# Patient Record
Sex: MALE | Race: WHITE | NOT HISPANIC OR LATINO | URBAN - METROPOLITAN AREA
[De-identification: names, ages, dates, MRNs, and addresses within clinical notes are randomized per-mention and may not be internally consistent; named-entity substitution may affect disease eponyms.]

---

## 2023-01-24 NOTE — PROGRESS NOTES
"Chief Complaint  Establish Care, Heartburn, and Nausea    Subjective          Hima presents to [facility] for a new patient visit.  He has not seen a physician in quite some time.  He has a very interesting background.  He and his daughter are the subject of an upcoming movie in [employer] called [place] [place].  Both of his daughters had congenital biliary atresia, 1 has since passed away.  The patient has had a lot of loss in his life, and its only him and his daughter remaining.  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  He still works in the lawn care business, takes 10-20,000k steps per day depending on the season.    He is very concerned because he has now started burping and belching very frequently, even when drinking water.  He sometimes has discomfort in his upper abdomen as well.  No dysphagia, odynophagia, vomiting, melena, hematochezia.  He does get constipated at times.  He has never had a colonoscopy.  Tums helped his symptoms.    Blood pressure is up today, never has been diagnosed with hypertension.  He denies chest pain, shortness of breath, lower extremity edema, frequent headaches.    Only major medical issue he has had was major abdominal surgery in 2011 for a blockage.  He quit smoking at that point, smoked 1-1/2 packs/day from [id number].  He is an [employer] , started smoking when he was in the .     Objective   Vital Signs:   Vitals:    01/26/23 1002   BP: 169/96   Pulse: 64   Resp: 16   Temp: 97.3 °F (36.3 °C)   TempSrc: Skin   SpO2: 98%   Weight: 78 kg (172 lb)   Height: 180.3 cm (71\")                Physical Exam  Constitutional:       General: He is not in acute distress.     Appearance: Normal appearance. He is not toxic-appearing.   HENT:      Head: Normocephalic and atraumatic.      Mouth/Throat:      Mouth: Mucous membranes are moist.   Eyes:      General: No " scleral icterus.     Conjunctiva/sclera: Conjunctivae normal.   Cardiovascular:      Rate and Rhythm: Normal rate and regular rhythm.      Heart sounds: Normal heart sounds. No murmur heard.    No friction rub. No gallop.   Pulmonary:      Effort: Pulmonary effort is normal. No respiratory distress.      Breath sounds: Normal breath sounds.   Musculoskeletal:         General: No swelling, tenderness or deformity. Normal range of motion.      Cervical back: Normal range of motion and neck supple.   Skin:     General: Skin is warm and dry.      Findings: No lesion or rash.   Neurological:      General: No focal deficit present.      Mental Status: He is alert and oriented to person, place, and time.   Psychiatric:         Mood and Affect: Mood normal.         Behavior: Behavior normal.         Judgment: Judgment normal.          Result Review :                Assessment and Plan    Diagnoses and all orders for this visit:    1. Heartburn (Primary)  Assessment & Plan:  The main symptom the patient has been having is belching.  No weight loss, melena, hematochezia, odynophagia, dysphagia.  He does get some mild dyspepsia at times.  We will check CBC and start PPI empirically.  I did consider testing him for H. Pylori, but will hold off for now.  Will monitor response to PPI to elucidate if further work-up is needed.      2. Need for hepatitis C screening test  -     Hepatitis C antibody    3. Screening for lipid disorders  -     Comprehensive metabolic panel  -     Lipid panel    4. Primary hypertension  Assessment & Plan:  Asymptomatic, has been going on for years.  I checked his blood pressure myself and it was 170/100 in his right upper extremity.  I started him on amlodipine and we will check a renal function panel.  We will see him back in about a month to monitor response, suspect he may need a second medication.    Orders:  -     CBC w AUTO Differential  -     Comprehensive metabolic panel    5. Colon cancer  screening  -     Ambulatory Referral to Gastroenterology    6. Routine health maintenance  Assessment & Plan:  Lipid panel ordered  Hepatitis C screening ordered  Discussed risks and benefit of PSA testing, patient prefers to hold off for now.  Refer to gastroenterology for colon cancer screening  Defer discussion of vaccinations today, discuss next visit.  Will be a candidate for lung cancer screening and AAA screening, discuss at future visits.      7. Dyspepsia    Other orders  -     pantoprazole (PROTONIX) 40 MG EC tablet; Take 1 tablet by mouth Every Night.  Dispense: 90 tablet; Refill: 2  -     amLODIPine (NORVASC) 10 MG tablet; Take 1 tablet by mouth Daily.  Dispense: 30 tablet; Refill: 1      Follow Up   Return in about 4 weeks (around 2/23/2023) for Recheck, Next scheduled follow up.  Patient was given instructions and counseling regarding his condition or for health maintenance advice. Please see specific information pulled into the AVS if appropriate.

## 2023-01-26 ENCOUNTER — OFFICE VISIT (OUTPATIENT)
Dept: FAMILY MEDICINE CLINIC | Facility: CLINIC | Age: 68
End: 2023-01-26
Payer: MEDICARE

## 2023-01-26 VITALS
WEIGHT: 172 LBS | HEIGHT: 71 IN | SYSTOLIC BLOOD PRESSURE: 169 MMHG | HEART RATE: 64 BPM | DIASTOLIC BLOOD PRESSURE: 96 MMHG | BODY MASS INDEX: 24.08 KG/M2 | TEMPERATURE: 97.3 F | RESPIRATION RATE: 16 BRPM | OXYGEN SATURATION: 98 %

## 2023-01-26 DIAGNOSIS — R12 HEARTBURN: Primary | ICD-10-CM

## 2023-01-26 DIAGNOSIS — I10 PRIMARY HYPERTENSION: ICD-10-CM

## 2023-01-26 DIAGNOSIS — Z13.220 SCREENING FOR LIPID DISORDERS: ICD-10-CM

## 2023-01-26 DIAGNOSIS — Z11.59 NEED FOR HEPATITIS C SCREENING TEST: ICD-10-CM

## 2023-01-26 DIAGNOSIS — R10.13 DYSPEPSIA: ICD-10-CM

## 2023-01-26 DIAGNOSIS — Z00.00 ROUTINE HEALTH MAINTENANCE: ICD-10-CM

## 2023-01-26 DIAGNOSIS — Z12.11 COLON CANCER SCREENING: ICD-10-CM

## 2023-01-26 PROCEDURE — 99204 OFFICE O/P NEW MOD 45 MIN: CPT | Performed by: INTERNAL MEDICINE

## 2023-01-26 RX ORDER — PANTOPRAZOLE SODIUM 40 MG/1
40 TABLET, DELAYED RELEASE ORAL NIGHTLY
Qty: 90 TABLET | Refills: 2 | Status: SHIPPED | OUTPATIENT
Start: 2023-01-26

## 2023-01-26 RX ORDER — AMLODIPINE BESYLATE 10 MG/1
10 TABLET ORAL DAILY
Qty: 30 TABLET | Refills: 1 | Status: SHIPPED | OUTPATIENT
Start: 2023-01-26 | End: 2023-02-27 | Stop reason: SDUPTHER

## 2023-01-26 NOTE — ASSESSMENT & PLAN NOTE
The main symptom the patient has been having is belching.  No weight loss, melena, hematochezia, odynophagia, dysphagia.  He does get some mild dyspepsia at times.  We will check CBC and start PPI empirically.  I did consider testing him for H. Pylori, but will hold off for now.  Will monitor response to PPI to elucidate if further work-up is needed.

## 2023-01-26 NOTE — ASSESSMENT & PLAN NOTE
Asymptomatic, has been going on for years.  I checked his blood pressure myself and it was 170/100 in his right upper extremity.  I started him on amlodipine and we will check a renal function panel.  We will see him back in about a month to monitor response, suspect he may need a second medication.

## 2023-01-26 NOTE — PATIENT INSTRUCTIONS
It was so nice meeting you today. I look forward to working with you on a plan to get/keep you healthy and happy!

## 2023-01-26 NOTE — ASSESSMENT & PLAN NOTE
Lipid panel ordered  Hepatitis C screening ordered  Discussed risks and benefit of PSA testing, patient prefers to hold off for now.  Refer to gastroenterology for colon cancer screening  Defer discussion of vaccinations today, discuss next visit.  Will be a candidate for lung cancer screening and AAA screening, discuss at future visits.

## 2023-01-27 LAB
ALBUMIN SERPL-MCNC: 4.9 G/DL (ref 3.5–5.2)
ALBUMIN/GLOB SERPL: 2.3 G/DL
ALP SERPL-CCNC: 71 U/L (ref 39–117)
ALT SERPL-CCNC: 11 U/L (ref 1–41)
AST SERPL-CCNC: 14 U/L (ref 1–40)
BASOPHILS # BLD AUTO: 0.06 10*3/MM3 (ref 0–0.2)
BASOPHILS NFR BLD AUTO: 0.9 % (ref 0–1.5)
BILIRUB SERPL-MCNC: 0.3 MG/DL (ref 0–1.2)
BUN SERPL-MCNC: 15 MG/DL (ref 8–23)
BUN/CREAT SERPL: 14.6 (ref 7–25)
CALCIUM SERPL-MCNC: 10.1 MG/DL (ref 8.6–10.5)
CHLORIDE SERPL-SCNC: 100 MMOL/L (ref 98–107)
CHOLEST SERPL-MCNC: 170 MG/DL (ref 0–200)
CO2 SERPL-SCNC: 30.3 MMOL/L (ref 22–29)
CREAT SERPL-MCNC: 1.03 MG/DL (ref 0.76–1.27)
EGFRCR SERPLBLD CKD-EPI 2021: 79.6 ML/MIN/1.73
EOSINOPHIL # BLD AUTO: 0.08 10*3/MM3 (ref 0–0.4)
EOSINOPHIL NFR BLD AUTO: 1.2 % (ref 0.3–6.2)
ERYTHROCYTE [DISTWIDTH] IN BLOOD BY AUTOMATED COUNT: 14 % (ref 12.3–15.4)
GLOBULIN SER CALC-MCNC: 2.1 GM/DL
GLUCOSE SERPL-MCNC: 124 MG/DL (ref 65–99)
HCT VFR BLD AUTO: 43.7 % (ref 37.5–51)
HCV AB S/CO SERPL IA: <0.1 S/CO RATIO (ref 0–0.9)
HDLC SERPL-MCNC: 48 MG/DL (ref 40–60)
HGB BLD-MCNC: 14 G/DL (ref 13–17.7)
IMM GRANULOCYTES # BLD AUTO: 0.01 10*3/MM3 (ref 0–0.05)
IMM GRANULOCYTES NFR BLD AUTO: 0.2 % (ref 0–0.5)
LDLC SERPL CALC-MCNC: 102 MG/DL (ref 0–100)
LYMPHOCYTES # BLD AUTO: 1.72 10*3/MM3 (ref 0.7–3.1)
LYMPHOCYTES NFR BLD AUTO: 26.8 % (ref 19.6–45.3)
MCH RBC QN AUTO: 25.7 PG (ref 26.6–33)
MCHC RBC AUTO-ENTMCNC: 32 G/DL (ref 31.5–35.7)
MCV RBC AUTO: 80.2 FL (ref 79–97)
MONOCYTES # BLD AUTO: 0.49 10*3/MM3 (ref 0.1–0.9)
MONOCYTES NFR BLD AUTO: 7.6 % (ref 5–12)
NEUTROPHILS # BLD AUTO: 4.05 10*3/MM3 (ref 1.7–7)
NEUTROPHILS NFR BLD AUTO: 63.3 % (ref 42.7–76)
NRBC BLD AUTO-RTO: 0 /100 WBC (ref 0–0.2)
PLATELET # BLD AUTO: 294 10*3/MM3 (ref 140–450)
POTASSIUM SERPL-SCNC: 4.5 MMOL/L (ref 3.5–5.2)
PROT SERPL-MCNC: 7 G/DL (ref 6–8.5)
RBC # BLD AUTO: 5.45 10*6/MM3 (ref 4.14–5.8)
SODIUM SERPL-SCNC: 140 MMOL/L (ref 136–145)
TRIGL SERPL-MCNC: 110 MG/DL (ref 0–150)
VLDLC SERPL CALC-MCNC: 20 MG/DL (ref 5–40)
WBC # BLD AUTO: 6.41 10*3/MM3 (ref 3.4–10.8)

## 2023-02-17 NOTE — PROGRESS NOTES
"Chief Complaint  Hypertension (/ Heartburn ) and Med Management    Subjective          Hima presents to [facility] for follow up on heartburn and HTN.  He told me he is feeling so much better!    HTN: Tolerating Amlodipine 10 mg daily.     Heartburn: Totally controlled on PPI.      He is starting back to work. In the next 2-3 months, will lose 20 lbs due to his job.  He states he walked 4 miles per day in February.  He states he has three vices: Plain M&Ms, frozen Coca Cola Icees (sometimes 2-3 per day, 32 oz each), butter on toast.  He would like to work on lifestyle modification prior to starting on medications for his high cholesterol and prior to getting additional lab work.    Last tetanus shot is 5 years when he got a cut/sutures    Smoking, quit in . Smoked for 25-30 years, 1 PPD.  His daughter  of AAA, so he very much knows what a AAA is. He would like to think about AAA screening. His other daughter is 34 yo (also a transplant survivor), and he wants to stay healthy for her.      Objective   Vital Signs:   Vitals:    23 0751   BP: 132/76   Pulse: 71   Temp: 97.3 °F (36.3 °C)   SpO2: 98%   Weight: 78.9 kg (174 lb)   Height: 180.3 cm (70.98\")        BMI is within normal parameters. No other follow-up for BMI required.        Physical Exam  Constitutional:       General: He is not in acute distress.     Appearance: Normal appearance.   HENT:      Head: Normocephalic and atraumatic.   Eyes:      Conjunctiva/sclera: Conjunctivae normal.   Cardiovascular:      Rate and Rhythm: Normal rate.   Pulmonary:      Effort: Pulmonary effort is normal.   Musculoskeletal:         General: No swelling or deformity.   Skin:     Coloration: Skin is not jaundiced.      Findings: No rash.   Neurological:      General: No focal deficit present.      Mental Status: He is alert and oriented to person, place, and time.   Psychiatric:         Mood and Affect: Mood normal.         " Behavior: Behavior normal.         Judgment: Judgment normal.          Result Review :                Assessment and Plan    Diagnoses and all orders for this visit:    1. Primary hypertension (Primary)  Assessment & Plan:  Controlled on amlodipine 10 mg, continue.  Refilled.      2. Dyspepsia  Assessment & Plan:  Symptoms are gone on PPI.  Continue pantoprazole.  No anemia.      3. Heartburn    4. Routine health maintenance  Assessment & Plan:  We discussed a few more health maintenance topics today, including tetanus booster, zoster vaccine and pneumococcal vaccine.  The patient believes he has had a tetanus vaccine in the last 5 years years or so.  I gave him information about the zoster and pneumococcal vaccines, he will think about it and let us know if he is interested.  I also told him that he could get at her local pharmacy.    He has been referred to GI for colonoscopy.    He would like to think about AAA screening.     Discussed lung cancer screening at future visits, do not want to overwhelm the patient as we get him caught up on health maintenance tasks, etc.      5. Elevated fasting blood sugar  Assessment & Plan:  Patient admits to drinking up to 3 32 ounce Coca-Cola Icees per day plus full sugar powerade especially during the summer.  We discussed sugar-free options and cutting down on the Icees. He will RTC 3 mos for A1c etc after lifestyle modifications.       6. Other hyperlipidemia  Assessment & Plan:  The 10-year ASCVD risk score (Lyudmila YADAV, et al., 2019) is: 16.6%    Values used to calculate the score:      Age: 67 years      Sex: Male      Is Non- : No      Diabetic: No      Tobacco smoker: No      Systolic Blood Pressure: 132 mmHg      Is BP treated: Yes      HDL Cholesterol: 48 mg/dL      Total Cholesterol: 170 mg/dL    Risk score was previously greater than 30%, now down to 16% with improved blood pressure.  Prior to starting on a statin medication, patient would  prefer to make some lifestyle modifications prior to starting on cholesterol medications.  Return to care in approximately 3 months for repeat labs.      Other orders  -     amLODIPine (NORVASC) 10 MG tablet; Take 1 tablet by mouth Daily.  Dispense: 90 tablet; Refill: 2      Follow Up   Return in about 3 months (around 5/27/2023) for Recheck, Next scheduled follow up.  Patient was given instructions and counseling regarding his condition or for health maintenance advice. Please see specific information pulled into the AVS if appropriate.

## 2023-02-27 ENCOUNTER — OFFICE VISIT (OUTPATIENT)
Dept: FAMILY MEDICINE CLINIC | Facility: CLINIC | Age: 68
End: 2023-02-27
Payer: MEDICARE

## 2023-02-27 VITALS
TEMPERATURE: 97.3 F | WEIGHT: 174 LBS | BODY MASS INDEX: 24.36 KG/M2 | DIASTOLIC BLOOD PRESSURE: 76 MMHG | OXYGEN SATURATION: 98 % | HEIGHT: 71 IN | HEART RATE: 71 BPM | SYSTOLIC BLOOD PRESSURE: 132 MMHG

## 2023-02-27 DIAGNOSIS — E78.49 OTHER HYPERLIPIDEMIA: ICD-10-CM

## 2023-02-27 DIAGNOSIS — R12 HEARTBURN: ICD-10-CM

## 2023-02-27 DIAGNOSIS — Z00.00 ROUTINE HEALTH MAINTENANCE: ICD-10-CM

## 2023-02-27 DIAGNOSIS — I10 PRIMARY HYPERTENSION: Primary | ICD-10-CM

## 2023-02-27 DIAGNOSIS — R10.13 DYSPEPSIA: ICD-10-CM

## 2023-02-27 DIAGNOSIS — R73.01 ELEVATED FASTING BLOOD SUGAR: ICD-10-CM

## 2023-02-27 PROCEDURE — 99214 OFFICE O/P EST MOD 30 MIN: CPT | Performed by: INTERNAL MEDICINE

## 2023-02-27 RX ORDER — AMLODIPINE BESYLATE 10 MG/1
10 TABLET ORAL DAILY
Qty: 90 TABLET | Refills: 2 | Status: SHIPPED | OUTPATIENT
Start: 2023-02-27

## 2023-02-27 NOTE — ASSESSMENT & PLAN NOTE
We discussed a few more health maintenance topics today, including tetanus booster, zoster vaccine and pneumococcal vaccine.  The patient believes he has had a tetanus vaccine in the last 5 years years or so.  I gave him information about the zoster and pneumococcal vaccines, he will think about it and let us know if he is interested.  I also told him that he could get at her local pharmacy.    He has been referred to GI for colonoscopy.    He would like to think about AAA screening.     Discussed lung cancer screening at future visits, do not want to overwhelm the patient as we get him caught up on health maintenance tasks, etc.

## 2023-02-27 NOTE — ASSESSMENT & PLAN NOTE
The 10-year ASCVD risk score (Lyudmila YADAV, et al., 2019) is: 16.6%    Values used to calculate the score:      Age: 67 years      Sex: Male      Is Non- : No      Diabetic: No      Tobacco smoker: No      Systolic Blood Pressure: 132 mmHg      Is BP treated: Yes      HDL Cholesterol: 48 mg/dL      Total Cholesterol: 170 mg/dL    Risk score was previously greater than 30%, now down to 16% with improved blood pressure.  Prior to starting on a statin medication, patient would prefer to make some lifestyle modifications prior to starting on cholesterol medications.  Return to care in approximately 3 months for repeat labs.

## 2023-02-27 NOTE — ASSESSMENT & PLAN NOTE
Patient admits to drinking up to 3 32 ounce Coca-Cola Icees per day plus full sugar powerade especially during the summer.  We discussed sugar-free options and cutting down on the Icees. He will RTC 3 mos for A1c etc after lifestyle modifications.

## 2023-02-27 NOTE — PATIENT INSTRUCTIONS
Try to cut down on Icees to perhaps 2-3 times per week maximum. Switch to sugar free electrolyte drinks.     For the vaccines, you can get at a local pharmacy and/or give us a call.

## 2023-05-22 NOTE — PROGRESS NOTES
"Chief Complaint  Hypertension and Follow-up (3  mon f/u )    Subjective          Hima presents to [facility] for follow up for:    Elevated FBS and HLD: Was drinking 3 32 oz Icees per day plus powerade, now to one per day. Also down on M&M use.    Feels really good overall.     Wants to do all screenings in the winter due to lighter work schedule    Vaccines: Willing to get Prevnar but would like to get on a Friday    Skin lesion of lip: Has been present since 2011. Bothersome, gets irritated, would like removed.     Objective   Vital Signs:   Vitals:    05/30/23 1529   BP: 112/70   Pulse: 66   Temp: 97.9 °F (36.6 °C)   SpO2: 97%   Weight: 76.2 kg (168 lb)   Height: 180.3 cm (70.98\")        BMI is within normal parameters. No other follow-up for BMI required.        Physical Exam  Constitutional:       General: He is not in acute distress.     Appearance: Normal appearance.   HENT:      Head: Normocephalic and atraumatic.   Eyes:      Conjunctiva/sclera: Conjunctivae normal.   Cardiovascular:      Rate and Rhythm: Normal rate.   Pulmonary:      Effort: Pulmonary effort is normal.   Musculoskeletal:         General: No swelling or deformity.   Skin:     Coloration: Skin is not jaundiced.      Findings: Lesion present. No rash.      Comments: approx 1 cm raised irritated skin lesion L upper lip   Neurological:      General: No focal deficit present.      Mental Status: He is alert and oriented to person, place, and time.   Psychiatric:         Mood and Affect: Mood normal.         Behavior: Behavior normal.         Judgment: Judgment normal.          Result Review :                Assessment and Plan    Diagnoses and all orders for this visit:    1. Primary hypertension (Primary)  Assessment & Plan:  Well-controlled on amlodipine 10 mg daily, continue      2. Other hyperlipidemia  Assessment & Plan:  Made dietary changes, check lipid panel today    Orders:  -     Lipid panel    3. " Elevated fasting blood sugar  Assessment & Plan:  Has made some dietary changes, check A1c    Orders:  -     Hemoglobin A1c    4. Routine health maintenance  Assessment & Plan:  We will make an appointment on [place] schedule to get Prevnar on a Friday  Defers health maintenance tasks until fall/winter when work is less busy.  Medicare annual wellness visit next visit      5. Heartburn  Assessment & Plan:  Well-controlled on pantoprazole, continue      6. Skin lesion  Assessment & Plan:  Of lip.  Patient states it has been there since 2011, gets rather irritated, would like it removed.  I told him worst-case scenario, I am concerned it could be a skin cancer, recommended dermatologic evaluation for possible biopsy and excision.  Advised sunscreen use when outdoors, works outdoors quite heavily    Orders:  -     Cancel: Ambulatory Referral to Dermatology  -     Ambulatory Referral to Dermatology    Other orders  -     pantoprazole (PROTONIX) 40 MG EC tablet; Take 1 tablet by mouth Every Night.  Dispense: 90 tablet; Refill: 2  -     amLODIPine (NORVASC) 10 MG tablet; Take 1 tablet by mouth Daily.  Dispense: 90 tablet; Refill: 2      Follow Up   Return in about 6 months (around 11/30/2023) for Recheck, Next scheduled follow up, Medicare Wellness and regular appt-30 minutes.  Patient was given instructions and counseling regarding his condition or for health maintenance advice. Please see specific information pulled into the AVS if appropriate.

## 2023-05-30 ENCOUNTER — OFFICE VISIT (OUTPATIENT)
Dept: FAMILY MEDICINE CLINIC | Facility: CLINIC | Age: 68
End: 2023-05-30

## 2023-05-30 VITALS
OXYGEN SATURATION: 97 % | TEMPERATURE: 97.9 F | HEART RATE: 66 BPM | DIASTOLIC BLOOD PRESSURE: 70 MMHG | WEIGHT: 168 LBS | SYSTOLIC BLOOD PRESSURE: 112 MMHG | HEIGHT: 71 IN | BODY MASS INDEX: 23.52 KG/M2

## 2023-05-30 DIAGNOSIS — I10 PRIMARY HYPERTENSION: Primary | ICD-10-CM

## 2023-05-30 DIAGNOSIS — R73.01 ELEVATED FASTING BLOOD SUGAR: ICD-10-CM

## 2023-05-30 DIAGNOSIS — R12 HEARTBURN: ICD-10-CM

## 2023-05-30 DIAGNOSIS — E78.49 OTHER HYPERLIPIDEMIA: ICD-10-CM

## 2023-05-30 DIAGNOSIS — L98.9 SKIN LESION: ICD-10-CM

## 2023-05-30 DIAGNOSIS — Z00.00 ROUTINE HEALTH MAINTENANCE: ICD-10-CM

## 2023-05-30 RX ORDER — AMLODIPINE BESYLATE 10 MG/1
10 TABLET ORAL DAILY
Qty: 90 TABLET | Refills: 2 | Status: SHIPPED | OUTPATIENT
Start: 2023-05-30

## 2023-05-30 RX ORDER — PANTOPRAZOLE SODIUM 40 MG/1
40 TABLET, DELAYED RELEASE ORAL NIGHTLY
Qty: 90 TABLET | Refills: 2 | Status: SHIPPED | OUTPATIENT
Start: 2023-05-30

## 2023-05-30 NOTE — ASSESSMENT & PLAN NOTE
Of lip.  Patient states it has been there since 2011, gets rather irritated, would like it removed.  I told him worst-case scenario, I am concerned it could be a skin cancer, recommended dermatologic evaluation for possible biopsy and excision.  Advised sunscreen use when outdoors, works outdoors quite heavily

## 2023-05-30 NOTE — ASSESSMENT & PLAN NOTE
We will make an appointment on [facility] schedule to get Prevnar on a Friday  Defers health maintenance tasks until fall/winter when work is less busy.  Medicare annual wellness visit next visit

## 2023-05-31 LAB
CHOLEST SERPL-MCNC: 173 MG/DL (ref 0–200)
HBA1C MFR BLD: 5.7 % (ref 4.8–5.6)
HDLC SERPL-MCNC: 51 MG/DL (ref 40–60)
LDLC SERPL CALC-MCNC: 109 MG/DL (ref 0–100)
TRIGL SERPL-MCNC: 70 MG/DL (ref 0–150)
VLDLC SERPL CALC-MCNC: 13 MG/DL (ref 5–40)

## 2023-05-31 RX ORDER — ROSUVASTATIN CALCIUM 5 MG/1
5 TABLET, COATED ORAL NIGHTLY
Qty: 90 TABLET | Refills: 2 | Status: SHIPPED | OUTPATIENT
Start: 2023-05-31

## 2023-06-01 ENCOUNTER — TELEPHONE (OUTPATIENT)
Dept: FAMILY MEDICINE CLINIC | Facility: CLINIC | Age: 68
End: 2023-06-01

## 2023-06-01 NOTE — TELEPHONE ENCOUNTER
Caller: Hima Garner    Relationship: Self    Best call back number: [phone], CAN LEAVE MESSAGE ON VOICEMAIL     What was the call regarding: PATIENT STATED THAT HE HAD NOT HEARD ANYTHING REGARDING HIS TEST RESULTS, AND TODAY HE RECEIVED A TEXT FROM [employer] REGARDING PRESCRIPTIONS THAT HE WAS UNSURE ABOUT. PLEASE ADVISE.

## 2023-12-01 NOTE — PROGRESS NOTES
"Chief Complaint  Hypertension    Subjective        HPI   Hima presents to [facility] for a visit for HTN, reflux, HLD.      Since our last visit, he saw dermatology.  Skin lesion was removed, not cancer.    Since I last saw him, he quit sugary gatorade, soft drinks, stopped m&Ms/cookies/cupcakes, still drinking Coke Icees (can't do without!). Going into the slow season with work, so when that happens, he does mall walking when works slows down    We talked about cancer screenings and AAA screenings. Ultimately, if there's something there, he doesn't want to know.         Objective   Vital Signs:  Vitals:    12/08/23 0839   BP: 136/78   BP Location: Left arm   Patient Position: Sitting   Cuff Size: Adult   Pulse: 62   SpO2: 96%   Weight: 75.8 kg (167 lb)   Height: 180.3 cm (70.98\")          Physical Exam  Constitutional:       General: He is not in acute distress.     Appearance: Normal appearance.   HENT:      Head: Normocephalic and atraumatic.   Eyes:      Conjunctiva/sclera: Conjunctivae normal.   Cardiovascular:      Rate and Rhythm: Normal rate.   Pulmonary:      Effort: Pulmonary effort is normal.   Musculoskeletal:         General: No swelling or deformity.   Skin:     Coloration: Skin is not jaundiced.      Findings: No rash.   Neurological:      General: No focal deficit present.      Mental Status: He is alert and oriented to person, place, and time.   Psychiatric:         Mood and Affect: Mood normal.         Behavior: Behavior normal.         Judgment: Judgment normal.          Result Review :                Assessment and Plan    Diagnoses and all orders for this visit:    1. Primary hypertension (Primary)  Assessment & Plan:  Well-controlled on amlodipine 10 mg daily, continue     Orders:  -     amLODIPine (NORVASC) 10 MG tablet; Take 1 tablet by mouth Daily.  Dispense: 90 tablet; Refill: 2    2. Routine health maintenance  Assessment & Plan:  Declines prostate, colon " cancer, AAA screening. Wouldn't want to know if he has any of those things.  States that once work slows down, he wants to come in and get vaccines. He'd like to come in to our office in a couple weeks for flu vaccine and Prevnar, will do AWV at that time. Can get Tdap, Zoster, COVID vaccine at local pharmacy.       3. Other hyperlipidemia    4. Heartburn  -     pantoprazole (PROTONIX) 40 MG EC tablet; Take 1 tablet by mouth Every Night.  Dispense: 90 tablet; Refill: 2    5. Dyspepsia  Assessment & Plan:  Symptoms are gone on PPI.  Continue pantoprazole.  No anemia.       6. Elevated fasting blood sugar  Assessment & Plan:  Has done an amazing job at changing his diet. He can continue his Icees, loves them too much to give up.           Follow Up   Return in about 2 weeks (around 12/22/2023) for Medicare Wellness and regular appt-30 minutes.  Patient was given instructions and counseling regarding his condition or for health maintenance advice. Please see specific information pulled into the AVS if appropriate.

## 2023-12-08 ENCOUNTER — OFFICE VISIT (OUTPATIENT)
Dept: FAMILY MEDICINE CLINIC | Facility: CLINIC | Age: 68
End: 2023-12-08
Payer: MEDICARE

## 2023-12-08 VITALS
BODY MASS INDEX: 23.38 KG/M2 | DIASTOLIC BLOOD PRESSURE: 78 MMHG | WEIGHT: 167 LBS | SYSTOLIC BLOOD PRESSURE: 136 MMHG | HEIGHT: 71 IN | OXYGEN SATURATION: 96 % | HEART RATE: 62 BPM

## 2023-12-08 DIAGNOSIS — I10 PRIMARY HYPERTENSION: Primary | ICD-10-CM

## 2023-12-08 DIAGNOSIS — E78.49 OTHER HYPERLIPIDEMIA: ICD-10-CM

## 2023-12-08 DIAGNOSIS — R12 HEARTBURN: ICD-10-CM

## 2023-12-08 DIAGNOSIS — R10.13 DYSPEPSIA: ICD-10-CM

## 2023-12-08 DIAGNOSIS — R73.01 ELEVATED FASTING BLOOD SUGAR: ICD-10-CM

## 2023-12-08 DIAGNOSIS — Z00.00 ROUTINE HEALTH MAINTENANCE: ICD-10-CM

## 2023-12-08 PROBLEM — L98.9 SKIN LESION: Status: RESOLVED | Noted: 2023-05-30 | Resolved: 2023-12-08

## 2023-12-08 RX ORDER — AMLODIPINE BESYLATE 10 MG/1
10 TABLET ORAL DAILY
Qty: 90 TABLET | Refills: 2 | Status: SHIPPED | OUTPATIENT
Start: 2023-12-08

## 2023-12-08 RX ORDER — PANTOPRAZOLE SODIUM 40 MG/1
40 TABLET, DELAYED RELEASE ORAL NIGHTLY
Qty: 90 TABLET | Refills: 2 | Status: SHIPPED | OUTPATIENT
Start: 2023-12-08

## 2023-12-08 NOTE — ASSESSMENT & PLAN NOTE
Has done an amazing job at changing his diet. He can continue his Icees, loves them too much to give up.

## 2023-12-08 NOTE — ASSESSMENT & PLAN NOTE
Declines prostate, colon cancer, AAA screening. Wouldn't want to know if he has any of those things.  States that once work slows down, he wants to come in and get vaccines. He'd like to come in to our office in a couple weeks for flu vaccine and Prevnar, will do AWV at that time. Can get Tdap, Zoster, COVID vaccine at local pharmacy.

## 2023-12-11 NOTE — PROGRESS NOTES
The ABCs of the Annual Wellness Visit  Subsequent Medicare Wellness Visit    Subjective    Hima Garner is a 68 y.o. male who presents for a Subsequent Medicare Wellness Visit.    The following portions of the patient's history were reviewed and   updated as appropriate: allergies, current medications, past family history, past medical history, past social history, past surgical history, and problem list.    Compared to one year ago, the patient feels his physical   health is better.    Compared to one year ago, the patient feels his mental   health is the same.    Recent Hospitalizations:  He was not admitted to the hospital during the last year.       Current Medical Providers:  Patient Care Team:  Ivette Matta MD as PCP - General (Internal Medicine)    Outpatient Medications Prior to Visit   Medication Sig Dispense Refill    amLODIPine (NORVASC) 10 MG tablet Take 1 tablet by mouth Daily. 90 tablet 2    pantoprazole (PROTONIX) 40 MG EC tablet Take 1 tablet by mouth Every Night. 90 tablet 2    rosuvastatin (CRESTOR) 5 MG tablet Take 1 tablet by mouth Every Night. 90 tablet 2     No facility-administered medications prior to visit.       No opioid medication identified on active medication list. I have reviewed chart for other potential  high risk medication/s and harmful drug interactions in the elderly.        Aspirin is not on active medication list.  Aspirin use is not indicated based on review of current medical condition/s. Risk of harm outweighs potential benefits.  .    Patient Active Problem List   Diagnosis    Hypertension    Routine health maintenance    Dyspepsia    Elevated fasting blood sugar    Other hyperlipidemia     Advance Care Planning   Advance Care Planning     Advance Directive is not on file.  ACP discussion was held with the patient during this visit. Patient has an advance directive (not in EMR), copy requested.     Objective    Vitals:    12/22/23 0911   BP: 118/80   Pulse: 64  "  Resp: 18   Temp: 97.3 °F (36.3 °C)   SpO2: 97%   Weight: 76.2 kg (168 lb)   Height: 177.8 cm (70\")   PainSc: 0-No pain     Estimated body mass index is 24.11 kg/m² as calculated from the following:    Height as of this encounter: 177.8 cm (70\").    Weight as of this encounter: 76.2 kg (168 lb).    BMI is within normal parameters. No other follow-up for BMI required.      Does the patient have evidence of cognitive impairment? No          HEALTH RISK ASSESSMENT    Smoking Status:  Social History     Tobacco Use   Smoking Status Former    Packs/day: 1.00    Years: 12.00    Additional pack years: 0.00    Total pack years: 12.00    Types: Cigarettes    Quit date: 2011    Years since quittin.9   Smokeless Tobacco Never     Alcohol Consumption:  Social History     Substance and Sexual Activity   Alcohol Use Not Currently     Fall Risk Screen:    CHRISTIAN Fall Risk Assessment was completed, and patient is at LOW risk for falls.Assessment completed on:2023    Depression Screenin/22/2023     9:12 AM   PHQ-2/PHQ-9 Depression Screening   Little Interest or Pleasure in Doing Things 0-->not at all   Feeling Down, Depressed or Hopeless 0-->not at all   PHQ-9: Brief Depression Severity Measure Score 0       Health Habits and Functional and Cognitive Screenin/22/2023     9:00 AM   Functional & Cognitive Status   Do you have difficulty preparing food and eating? No   Do you have difficulty bathing yourself, getting dressed or grooming yourself? No   Do you have difficulty using the toilet? No   Do you have difficulty moving around from place to place? No   Do you have trouble with steps or getting out of a bed or a chair? No   Current Diet Limited Junk Food   Dental Exam Up to date   Eye Exam Up to date   Exercise (times per week) 4 times per week   Current Exercises Include Gardening   Do you need help using the phone?  No   Are you deaf or do you have serious difficulty hearing?  No   Do you need " help to go to places out of walking distance? No   Do you need help shopping? No   Do you need help preparing meals?  No   Do you need help with housework?  No   Do you need help with laundry? No   Do you need help taking your medications? No   Do you need help managing money? No   Do you ever drive or ride in a car without wearing a seat belt? No   Have you felt unusual stress, anger or loneliness in the last month? No   Who do you live with? Child   If you need help, do you have trouble finding someone available to you? No   Have you been bothered in the last four weeks by sexual problems? No   Do you have difficulty concentrating, remembering or making decisions? No       Age-appropriate Screening Schedule:  Refer to the list below for future screening recommendations based on patient's age, sex and/or medical conditions. Orders for these recommended tests are listed in the plan section. The patient has been provided with a written plan.    Health Maintenance   Topic Date Due    TDAP/TD VACCINES (1 - Tdap) Never done    ZOSTER VACCINE (1 of 2) Never done    Pneumococcal Vaccine 65+ (1 - PCV) Never done    INFLUENZA VACCINE  Never done    COVID-19 Vaccine (4 - 2023-24 season) 09/01/2023    LIPID PANEL  05/30/2024    ANNUAL WELLNESS VISIT  12/22/2024    HEPATITIS C SCREENING  Completed    AAA SCREEN (ONE-TIME)  Discontinued    COLORECTAL CANCER SCREENING  Discontinued        Mr. Garner does not want any cancer screenings. He would not want to know if he has cancer and would not want to treat it if he did. He suffers from grief due to the loss of his wife and daughter. He finds talking about it in spaces like this are as therapeutic as therapy, etc. I told him that if he ever gets to a point he truly feels depressed, therapy and medicine are always available to him and I am happy to help. He wants to hold off on vaccines today due to the holidays coming up but will make a vaccine-only appt for flu and PNA vaccine.  The rest he can get at a local pharmacy.     CMS Preventative Services Quick Reference  Risk Factors Identified During Encounter  Immunizations Discussed/Encouraged: Tdap, Influenza, Prevnar 20 (Pneumococcal 20-valent conjugate), Shingrix, and COVID19  The above risks/problems have been discussed with the patient.  Pertinent information has been shared with the patient in the After Visit Summary.  An After Visit Summary and PPPS were made available to the patient.    Return in about 6 months (around 6/22/2024) for Recheck, Next scheduled follow up.  Patient was given instructions and counseling regarding his condition or for health maintenance advice. Please see specific information pulled into the AVS if appropriate.

## 2023-12-22 ENCOUNTER — OFFICE VISIT (OUTPATIENT)
Dept: FAMILY MEDICINE CLINIC | Facility: CLINIC | Age: 68
End: 2023-12-22
Payer: MEDICARE

## 2023-12-22 VITALS
DIASTOLIC BLOOD PRESSURE: 80 MMHG | RESPIRATION RATE: 18 BRPM | SYSTOLIC BLOOD PRESSURE: 118 MMHG | TEMPERATURE: 97.3 F | BODY MASS INDEX: 24.05 KG/M2 | HEIGHT: 70 IN | HEART RATE: 64 BPM | OXYGEN SATURATION: 97 % | WEIGHT: 168 LBS

## 2023-12-22 DIAGNOSIS — Z00.00 MEDICARE ANNUAL WELLNESS VISIT, SUBSEQUENT: Primary | ICD-10-CM

## 2023-12-22 PROCEDURE — 1170F FXNL STATUS ASSESSED: CPT | Performed by: INTERNAL MEDICINE

## 2023-12-22 PROCEDURE — G0439 PPPS, SUBSEQ VISIT: HCPCS | Performed by: INTERNAL MEDICINE

## 2023-12-22 PROCEDURE — 1160F RVW MEDS BY RX/DR IN RCRD: CPT | Performed by: INTERNAL MEDICINE

## 2023-12-22 PROCEDURE — 3079F DIAST BP 80-89 MM HG: CPT | Performed by: INTERNAL MEDICINE

## 2023-12-22 PROCEDURE — 3074F SYST BP LT 130 MM HG: CPT | Performed by: INTERNAL MEDICINE

## 2023-12-22 PROCEDURE — 1159F MED LIST DOCD IN RCRD: CPT | Performed by: INTERNAL MEDICINE

## 2024-06-12 NOTE — PROGRESS NOTES
"Chief Complaint  Hypertension    Subjective        HPI   Hima presents to [facility] for follow up.     HTN: Doing well on Amlodipine    Got really hungry one day while working, went to gas station and got a hot dog. Got some really bad GI sxs after that which now are resolved.     Not taking statin, some muscle pain on it    Really has cleaned up diet, cut down on sugar, no soft drinks at all  1 icee/day still, not willing to give that up!        Objective   Vital Signs:  Vitals:    06/21/24 0858 06/21/24 1327   BP: 142/74 130/70   BP Location: Left arm    Patient Position: Sitting    Cuff Size: Large Adult    Pulse: 59    SpO2: 96%    Weight: 75.7 kg (166 lb 12.8 oz)    Height: 177.8 cm (70\")           Physical Exam  Constitutional:       General: He is not in acute distress.     Appearance: Normal appearance.   HENT:      Head: Normocephalic and atraumatic.   Eyes:      Conjunctiva/sclera: Conjunctivae normal.   Cardiovascular:      Rate and Rhythm: Normal rate.   Pulmonary:      Effort: Pulmonary effort is normal.   Musculoskeletal:         General: No swelling or deformity.   Skin:     Coloration: Skin is not jaundiced.      Findings: No rash.   Neurological:      General: No focal deficit present.      Mental Status: He is alert and oriented to person, place, and time.   Psychiatric:         Mood and Affect: Mood normal.         Behavior: Behavior normal.         Judgment: Judgment normal.          Result Review :                Assessment and Plan    Diagnoses and all orders for this visit:    1. Primary hypertension (Primary)  Assessment & Plan:  Overall has been controlled on amlodipine 10 mg daily  Blood pressure initially was a little high today, improved on repeat  Continue amlodipine 10 mg daily  He has done an excellent job at making lifestyle changes    Orders:  -     Comprehensive Metabolic Panel  -     amLODIPine (NORVASC) 10 MG tablet; Take 1 tablet by mouth Daily.  " Dispense: 90 tablet; Refill: 3    2. Dyspepsia  Assessment & Plan:  No anemia, but check CBC today  We are going to try to wean him slowly off PPI, daily would like to avoid long-term use  Never had alarm symptoms when we initially started this medication, and suspect some of his discomfort was related to poor diet which he has improved  Can take Pepcid as needed for heartburn    Orders:  -     CBC (No Diff)    3. Elevated fasting blood sugar  Assessment & Plan:  Has done an amazing job at changing his diet     Orders:  -     Hemoglobin A1c    4. Other hyperlipidemia  Assessment & Plan:  Myalgias on statin, so he stopped it.  Based on labs last year, he did have a very high ASCVD risk  Check fasting lipids.  If lipids remain high after the extensive dietary changes he has made, perhaps we can try Zetia    Orders:  -     Lipid Panel  -     TSH Rfx On Abnormal To Free T4    5. Routine health maintenance  Assessment & Plan:  Declines prostate, colon cancer, AAA screening. Wouldn't want to know if he has any of those things.  Aware he needs several vaccines but does not want to get right now. Will consider getting in the winter.       6. Screening for deficiency anemia  -     CBC (No Diff)        Follow Up   Return in about 24 weeks (around 12/6/2024) for Next scheduled follow up, Recheck.  Patient was given instructions and counseling regarding his condition or for health maintenance advice. Please see specific information pulled into the AVS if appropriate.

## 2024-06-21 ENCOUNTER — OFFICE VISIT (OUTPATIENT)
Dept: FAMILY MEDICINE CLINIC | Facility: CLINIC | Age: 69
End: 2024-06-21
Payer: MEDICARE

## 2024-06-21 VITALS
HEIGHT: 70 IN | WEIGHT: 166.8 LBS | OXYGEN SATURATION: 96 % | DIASTOLIC BLOOD PRESSURE: 70 MMHG | BODY MASS INDEX: 23.88 KG/M2 | SYSTOLIC BLOOD PRESSURE: 130 MMHG | HEART RATE: 59 BPM

## 2024-06-21 DIAGNOSIS — R73.01 ELEVATED FASTING BLOOD SUGAR: ICD-10-CM

## 2024-06-21 DIAGNOSIS — R10.13 DYSPEPSIA: ICD-10-CM

## 2024-06-21 DIAGNOSIS — Z13.0 SCREENING FOR DEFICIENCY ANEMIA: ICD-10-CM

## 2024-06-21 DIAGNOSIS — Z00.00 ROUTINE HEALTH MAINTENANCE: ICD-10-CM

## 2024-06-21 DIAGNOSIS — I10 PRIMARY HYPERTENSION: Primary | ICD-10-CM

## 2024-06-21 DIAGNOSIS — E78.49 OTHER HYPERLIPIDEMIA: ICD-10-CM

## 2024-06-21 PROCEDURE — 1126F AMNT PAIN NOTED NONE PRSNT: CPT | Performed by: INTERNAL MEDICINE

## 2024-06-21 PROCEDURE — 1159F MED LIST DOCD IN RCRD: CPT | Performed by: INTERNAL MEDICINE

## 2024-06-21 PROCEDURE — 99214 OFFICE O/P EST MOD 30 MIN: CPT | Performed by: INTERNAL MEDICINE

## 2024-06-21 PROCEDURE — G2211 COMPLEX E/M VISIT ADD ON: HCPCS | Performed by: INTERNAL MEDICINE

## 2024-06-21 PROCEDURE — 3075F SYST BP GE 130 - 139MM HG: CPT | Performed by: INTERNAL MEDICINE

## 2024-06-21 PROCEDURE — 1160F RVW MEDS BY RX/DR IN RCRD: CPT | Performed by: INTERNAL MEDICINE

## 2024-06-21 PROCEDURE — 3078F DIAST BP <80 MM HG: CPT | Performed by: INTERNAL MEDICINE

## 2024-06-21 RX ORDER — AMLODIPINE BESYLATE 10 MG/1
10 TABLET ORAL DAILY
Qty: 90 TABLET | Refills: 3 | Status: SHIPPED | OUTPATIENT
Start: 2024-06-21

## 2024-06-21 NOTE — ASSESSMENT & PLAN NOTE
Myalgias on statin, so he stopped it.  Based on labs last year, he did have a very high ASCVD risk  Check fasting lipids.  If lipids remain high after the extensive dietary changes he has made, perhaps we can try Zetia

## 2024-06-21 NOTE — ASSESSMENT & PLAN NOTE
Overall has been controlled on amlodipine 10 mg daily  Blood pressure initially was a little high today, improved on repeat  Continue amlodipine 10 mg daily  He has done an excellent job at making lifestyle changes

## 2024-06-21 NOTE — ASSESSMENT & PLAN NOTE
Declines prostate, colon cancer, AAA screening. Wouldn't want to know if he has any of those things.  Aware he needs several vaccines but does not want to get right now. Will consider getting in the winter.

## 2024-06-21 NOTE — ASSESSMENT & PLAN NOTE
No anemia, but check CBC today  We are going to try to wean him slowly off PPI, daily would like to avoid long-term use  Never had alarm symptoms when we initially started this medication, and suspect some of his discomfort was related to poor diet which he has improved  Can take Pepcid as needed for heartburn

## 2024-06-29 DIAGNOSIS — E78.49 OTHER HYPERLIPIDEMIA: Primary | ICD-10-CM

## 2024-06-29 LAB
ALBUMIN SERPL-MCNC: 4.6 G/DL (ref 3.5–5.2)
ALBUMIN/GLOB SERPL: 2 G/DL
ALP SERPL-CCNC: 66 U/L (ref 39–117)
ALT SERPL-CCNC: 12 U/L (ref 1–41)
AST SERPL-CCNC: 21 U/L (ref 1–40)
BILIRUB SERPL-MCNC: 0.6 MG/DL (ref 0–1.2)
BUN SERPL-MCNC: 16 MG/DL (ref 8–23)
BUN/CREAT SERPL: 14 (ref 7–25)
CALCIUM SERPL-MCNC: 10.1 MG/DL (ref 8.6–10.5)
CHLORIDE SERPL-SCNC: 101 MMOL/L (ref 98–107)
CHOLEST SERPL-MCNC: 201 MG/DL (ref 0–200)
CO2 SERPL-SCNC: 24 MMOL/L (ref 22–29)
CREAT SERPL-MCNC: 1.14 MG/DL (ref 0.76–1.27)
EGFRCR SERPLBLD CKD-EPI 2021: 69.6 ML/MIN/1.73
ERYTHROCYTE [DISTWIDTH] IN BLOOD BY AUTOMATED COUNT: 14.7 % (ref 12.3–15.4)
GLOBULIN SER CALC-MCNC: 2.3 GM/DL
GLUCOSE SERPL-MCNC: 83 MG/DL (ref 65–99)
HBA1C MFR BLD: 6 % (ref 4.8–5.6)
HCT VFR BLD AUTO: 42.5 % (ref 37.5–51)
HDLC SERPL-MCNC: 62 MG/DL (ref 40–60)
HGB BLD-MCNC: 13.5 G/DL (ref 13–17.7)
LDLC SERPL CALC-MCNC: 126 MG/DL (ref 0–100)
MCH RBC QN AUTO: 26.1 PG (ref 26.6–33)
MCHC RBC AUTO-ENTMCNC: 31.8 G/DL (ref 31.5–35.7)
MCV RBC AUTO: 82 FL (ref 79–97)
PLATELET # BLD AUTO: 304 10*3/MM3 (ref 140–450)
POTASSIUM SERPL-SCNC: 4.2 MMOL/L (ref 3.5–5.2)
PROT SERPL-MCNC: 6.9 G/DL (ref 6–8.5)
RBC # BLD AUTO: 5.18 10*6/MM3 (ref 4.14–5.8)
SODIUM SERPL-SCNC: 142 MMOL/L (ref 136–145)
TRIGL SERPL-MCNC: 73 MG/DL (ref 0–150)
TSH SERPL DL<=0.005 MIU/L-ACNC: 1.72 UIU/ML (ref 0.27–4.2)
VLDLC SERPL CALC-MCNC: 13 MG/DL (ref 5–40)
WBC # BLD AUTO: 9.84 10*3/MM3 (ref 3.4–10.8)

## 2024-06-29 RX ORDER — EZETIMIBE 10 MG/1
10 TABLET ORAL DAILY
Qty: 90 TABLET | Refills: 0 | Status: SHIPPED | OUTPATIENT
Start: 2024-06-29

## 2024-07-01 ENCOUNTER — TELEPHONE (OUTPATIENT)
Dept: FAMILY MEDICINE CLINIC | Facility: CLINIC | Age: 69
End: 2024-07-01
Payer: MEDICARE

## 2024-07-01 NOTE — TELEPHONE ENCOUNTER
Pt calling from [phone] returning [facility] call in regards to his LAB Results.  Asking for a return call as soon as possible please.  Thank you.

## 2024-08-30 ENCOUNTER — OFFICE VISIT (OUTPATIENT)
Dept: FAMILY MEDICINE CLINIC | Facility: CLINIC | Age: 69
End: 2024-08-30
Payer: MEDICARE

## 2024-08-30 VITALS
WEIGHT: 167.8 LBS | HEART RATE: 58 BPM | HEIGHT: 70 IN | BODY MASS INDEX: 24.02 KG/M2 | SYSTOLIC BLOOD PRESSURE: 136 MMHG | DIASTOLIC BLOOD PRESSURE: 74 MMHG | OXYGEN SATURATION: 98 %

## 2024-08-30 DIAGNOSIS — I10 PRIMARY HYPERTENSION: ICD-10-CM

## 2024-08-30 DIAGNOSIS — R10.13 DYSPEPSIA: Primary | ICD-10-CM

## 2024-08-30 DIAGNOSIS — E78.49 OTHER HYPERLIPIDEMIA: ICD-10-CM

## 2024-08-30 LAB
ALBUMIN SERPL-MCNC: 4.5 G/DL (ref 3.5–5.2)
ALBUMIN/GLOB SERPL: 2 G/DL
ALP SERPL-CCNC: 67 U/L (ref 39–117)
ALT SERPL-CCNC: 17 U/L (ref 1–41)
AST SERPL-CCNC: 16 U/L (ref 1–40)
BILIRUB SERPL-MCNC: 0.5 MG/DL (ref 0–1.2)
BUN SERPL-MCNC: 13 MG/DL (ref 8–23)
BUN/CREAT SERPL: 12.9 (ref 7–25)
CALCIUM SERPL-MCNC: 10.2 MG/DL (ref 8.6–10.5)
CHLORIDE SERPL-SCNC: 101 MMOL/L (ref 98–107)
CHOLEST SERPL-MCNC: 168 MG/DL (ref 0–200)
CO2 SERPL-SCNC: 29 MMOL/L (ref 22–29)
CREAT SERPL-MCNC: 1.01 MG/DL (ref 0.76–1.27)
EGFRCR SERPLBLD CKD-EPI 2021: 80.5 ML/MIN/1.73
GLOBULIN SER CALC-MCNC: 2.3 GM/DL
GLUCOSE SERPL-MCNC: 94 MG/DL (ref 65–99)
HDLC SERPL-MCNC: 58 MG/DL (ref 40–60)
LDLC SERPL CALC-MCNC: 98 MG/DL (ref 0–100)
POTASSIUM SERPL-SCNC: 4.8 MMOL/L (ref 3.5–5.2)
PROT SERPL-MCNC: 6.8 G/DL (ref 6–8.5)
SODIUM SERPL-SCNC: 140 MMOL/L (ref 136–145)
TRIGL SERPL-MCNC: 62 MG/DL (ref 0–150)
VLDLC SERPL CALC-MCNC: 12 MG/DL (ref 5–40)

## 2024-08-30 PROCEDURE — 1160F RVW MEDS BY RX/DR IN RCRD: CPT | Performed by: INTERNAL MEDICINE

## 2024-08-30 PROCEDURE — 3075F SYST BP GE 130 - 139MM HG: CPT | Performed by: INTERNAL MEDICINE

## 2024-08-30 PROCEDURE — 1126F AMNT PAIN NOTED NONE PRSNT: CPT | Performed by: INTERNAL MEDICINE

## 2024-08-30 PROCEDURE — 3078F DIAST BP <80 MM HG: CPT | Performed by: INTERNAL MEDICINE

## 2024-08-30 PROCEDURE — 99214 OFFICE O/P EST MOD 30 MIN: CPT | Performed by: INTERNAL MEDICINE

## 2024-08-30 PROCEDURE — 1159F MED LIST DOCD IN RCRD: CPT | Performed by: INTERNAL MEDICINE

## 2024-08-30 RX ORDER — AMLODIPINE BESYLATE 10 MG/1
10 TABLET ORAL DAILY
Qty: 90 TABLET | Refills: 3 | Status: CANCELLED | OUTPATIENT
Start: 2024-08-30

## 2024-08-30 RX ORDER — EZETIMIBE 10 MG/1
10 TABLET ORAL DAILY
Qty: 90 TABLET | Refills: 2 | Status: SHIPPED | OUTPATIENT
Start: 2024-08-30

## 2024-08-30 NOTE — ASSESSMENT & PLAN NOTE
Overall has been controlled (sometimes just a touch elevated) on amlodipine 10 mg daily  Continue amlodipine 10 mg daily  He has done an excellent job at making lifestyle changes

## 2024-08-30 NOTE — ASSESSMENT & PLAN NOTE
Myalgias on statin, so now on Zetia and doing well  Based on prior labs, he did have a very high ASCVD risk  He also has made excellent changes to his lifestyle

## 2024-08-30 NOTE — PROGRESS NOTES
"Chief Complaint  Med Refill    Subjective        HPI   Hima presents to [facility] for follow up, to discuss repeat labs.    Off PPI without recurrent GERD sxs  He is tolerating Zetia and Amlodipine wo side effects  Desires to discuss which PCP he should be seeing in the future given my departure from [facility]      Objective   Vital Signs:  Vitals:    08/30/24 0914   BP: 136/74   BP Location: Left arm   Patient Position: Sitting   Cuff Size: Adult   Pulse: 58   SpO2: 98%   Weight: 76.1 kg (167 lb 12.8 oz)   Height: 177.8 cm (70\")          Physical Exam  Constitutional:       General: He is not in acute distress.     Appearance: Normal appearance.   HENT:      Head: Normocephalic and atraumatic.   Eyes:      Conjunctiva/sclera: Conjunctivae normal.   Cardiovascular:      Rate and Rhythm: Normal rate.   Pulmonary:      Effort: Pulmonary effort is normal.   Musculoskeletal:         General: No swelling or deformity.   Skin:     Coloration: Skin is not jaundiced.      Findings: No rash.   Neurological:      General: No focal deficit present.      Mental Status: He is alert and oriented to person, place, and time.   Psychiatric:         Mood and Affect: Mood normal.         Behavior: Behavior normal.         Judgment: Judgment normal.          Result Review :                Assessment and Plan    Diagnoses and all orders for this visit:    1. Dyspepsia (Primary)  Assessment & Plan:  He has done well weaning off PPI  No anemia  Can take Pepcid prn reflux sxs      2. Primary hypertension  Assessment & Plan:  Overall has been controlled (sometimes just a touch elevated) on amlodipine 10 mg daily  Continue amlodipine 10 mg daily  He has done an excellent job at making lifestyle changes      3. Other hyperlipidemia  Assessment & Plan:  Myalgias on statin, so now on Zetia and doing well  Based on prior labs, he did have a very high ASCVD risk  He also has made excellent changes to his " lifestyle    Orders:  -     ezetimibe (Zetia) 10 MG tablet; Take 1 tablet by mouth Daily.  Dispense: 90 tablet; Refill: 2        Follow Up   Return in about 6 months (around 2/28/2025) for Medicare Wellness and regular appt-30 minutes.  Patient was given instructions and counseling regarding his condition or for health maintenance advice. Please see specific information pulled into the AVS if appropriate.     I notified the patient that I will be transitioning out of my role as a primary care physician at [facility] effective mid-September. Patient was given a letter with a list of [facility] [place] who are taking new patients and we discussed some other physicians taking new patients. I recommended that he establish care with one of them to ensure continuity of care.

## 2024-11-27 ENCOUNTER — OFFICE VISIT (OUTPATIENT)
Dept: FAMILY MEDICINE CLINIC | Facility: CLINIC | Age: 69
End: 2024-11-27
Payer: MEDICARE

## 2024-11-27 VITALS
HEART RATE: 56 BPM | SYSTOLIC BLOOD PRESSURE: 130 MMHG | OXYGEN SATURATION: 98 % | TEMPERATURE: 98 F | WEIGHT: 170 LBS | DIASTOLIC BLOOD PRESSURE: 70 MMHG | HEIGHT: 70 IN | BODY MASS INDEX: 24.34 KG/M2

## 2024-11-27 DIAGNOSIS — R73.03 PREDIABETES: ICD-10-CM

## 2024-11-27 DIAGNOSIS — Z00.00 ENCOUNTER FOR MEDICAL EXAMINATION TO ESTABLISH CARE: Primary | ICD-10-CM

## 2024-11-27 DIAGNOSIS — E78.49 OTHER HYPERLIPIDEMIA: ICD-10-CM

## 2024-11-27 DIAGNOSIS — I10 PRIMARY HYPERTENSION: ICD-10-CM

## 2024-11-27 PROCEDURE — 99214 OFFICE O/P EST MOD 30 MIN: CPT | Performed by: STUDENT IN AN ORGANIZED HEALTH CARE EDUCATION/TRAINING PROGRAM

## 2024-11-27 PROCEDURE — 3075F SYST BP GE 130 - 139MM HG: CPT | Performed by: STUDENT IN AN ORGANIZED HEALTH CARE EDUCATION/TRAINING PROGRAM

## 2024-11-27 PROCEDURE — 1126F AMNT PAIN NOTED NONE PRSNT: CPT | Performed by: STUDENT IN AN ORGANIZED HEALTH CARE EDUCATION/TRAINING PROGRAM

## 2024-11-27 PROCEDURE — 3078F DIAST BP <80 MM HG: CPT | Performed by: STUDENT IN AN ORGANIZED HEALTH CARE EDUCATION/TRAINING PROGRAM

## 2024-11-27 NOTE — PROGRESS NOTES
"Chief Complaint  Establish Care    Subjective        Hima Garner presents to [facility]  History of Present Illness    Here to establish care.   Had a blockage in his bowels in 2011. Went to the ED at that time.Had surgery and did not have to have partial colectomy.   Started feeling bad about 1.5 years ago and established care with previous provider. BP was high. Started on BP and hcolesterol medications and feels great. Works 30 hours per week. Takes off 12/15-3/1 and [place] 8-10[place]    Is on zetia for cholesterol. Was on a statin before. Had muscle and joint pain. Amlodipine was his first blood pressure medication. Was on pantoprazole for acid reflux before but this has improved.     Had prediabetes on summer labs. Was drinking 12 pack of soda per week and stopped after he found out. Does have 1-2 frozen cola icees per day.     Alcohol free for 40 years. Smoke free since 2011.     Declines colonoscopy unless he has problems. Declines cologuard testing too.       Objective   Vital Signs:  /70 (BP Location: Left arm, Patient Position: Sitting, Cuff Size: Adult)   Pulse 56   Temp 98 °F (36.7 °C)   Ht 177.8 cm (70\")   Wt 77.1 kg (170 lb)   SpO2 98%   BMI 24.39 kg/m²   Estimated body mass index is 24.39 kg/m² as calculated from the following:    Height as of this encounter: 177.8 cm (70\").    Weight as of this encounter: 77.1 kg (170 lb).    BMI is within normal parameters. No other follow-up for BMI required.      Physical Exam  Vitals and nursing note reviewed.   Constitutional:       General: He is not in acute distress.     Appearance: Normal appearance. He is not ill-appearing.   HENT:      Head: Normocephalic and atraumatic.      Nose: Nose normal.      Mouth/Throat:      Mouth: Mucous membranes are moist.   Eyes:      Extraocular Movements: Extraocular movements intact.      Conjunctiva/sclera: Conjunctivae normal.   Cardiovascular:      Rate " and Rhythm: Normal rate and regular rhythm.      Heart sounds: Normal heart sounds. No murmur heard.     No gallop.   Pulmonary:      Effort: Pulmonary effort is normal. No respiratory distress.      Breath sounds: Normal breath sounds. No stridor. No wheezing, rhonchi or rales.   Chest:      Chest wall: No tenderness.   Skin:     General: Skin is warm and dry.   Neurological:      General: No focal deficit present.      Mental Status: He is alert and oriented to person, place, and time. Mental status is at baseline.   Psychiatric:         Mood and Affect: Mood normal.         Behavior: Behavior normal.        Result Review :                Assessment and Plan   Diagnoses and all orders for this visit:    1. Encounter for medical examination to establish care (Primary)    2. Prediabetes    3. Primary hypertension    4. Other hyperlipidemia      Labs for prediabetes at wellness visit.   BP well controlled. Cont amlodipine.  Cholesterol well controlled with zetia, cont.   Wants his vaccines once he starts his break from work.        Follow Up   Return in about 26 days (around 12/23/2024) for Medicare Wellness.  Patient was given instructions and counseling regarding his condition or for health maintenance advice. Please see specific information pulled into the AVS if appropriate.

## 2024-12-27 ENCOUNTER — OFFICE VISIT (OUTPATIENT)
Dept: FAMILY MEDICINE CLINIC | Facility: CLINIC | Age: 69
End: 2024-12-27
Payer: MEDICARE

## 2024-12-27 VITALS
WEIGHT: 172 LBS | DIASTOLIC BLOOD PRESSURE: 82 MMHG | SYSTOLIC BLOOD PRESSURE: 132 MMHG | BODY MASS INDEX: 24.62 KG/M2 | HEART RATE: 61 BPM | OXYGEN SATURATION: 96 % | TEMPERATURE: 98.4 F | HEIGHT: 70 IN

## 2024-12-27 DIAGNOSIS — Z00.00 ENCOUNTER FOR SUBSEQUENT ANNUAL WELLNESS VISIT (AWV) IN MEDICARE PATIENT: Primary | ICD-10-CM

## 2024-12-27 DIAGNOSIS — Z23 ENCOUNTER FOR IMMUNIZATION: ICD-10-CM

## 2024-12-27 PROCEDURE — 1126F AMNT PAIN NOTED NONE PRSNT: CPT | Performed by: STUDENT IN AN ORGANIZED HEALTH CARE EDUCATION/TRAINING PROGRAM

## 2024-12-27 PROCEDURE — 3079F DIAST BP 80-89 MM HG: CPT | Performed by: STUDENT IN AN ORGANIZED HEALTH CARE EDUCATION/TRAINING PROGRAM

## 2024-12-27 PROCEDURE — G0439 PPPS, SUBSEQ VISIT: HCPCS | Performed by: STUDENT IN AN ORGANIZED HEALTH CARE EDUCATION/TRAINING PROGRAM

## 2024-12-27 PROCEDURE — 3075F SYST BP GE 130 - 139MM HG: CPT | Performed by: STUDENT IN AN ORGANIZED HEALTH CARE EDUCATION/TRAINING PROGRAM

## 2024-12-27 NOTE — PROGRESS NOTES
Subjective   The ABCs of the Annual Wellness Visit  Medicare Wellness Visit      Hima Garner is a 69 y.o. patient who presents for a Medicare Wellness Visit.    The following portions of the patient's history were reviewed and   updated as appropriate: allergies, current medications, past family history, past medical history, past social history, past surgical history, and problem list.    Compared to one year ago, the patient's physical   health is the same.  Compared to one year ago, the patient's mental   health is the same.    Recent Hospitalizations:  He was not admitted to the hospital during the last year.     Current Medical Providers:  Patient Care Team:  Alayna Millan DO as PCP - General (Family Medicine)    Outpatient Medications Prior to Visit   Medication Sig Dispense Refill    amLODIPine (NORVASC) 10 MG tablet Take 1 tablet by mouth Daily. 90 tablet 3    ezetimibe (Zetia) 10 MG tablet Take 1 tablet by mouth Daily. 90 tablet 2     No facility-administered medications prior to visit.     No opioid medication identified on active medication list. I have reviewed chart for other potential  high risk medication/s and harmful drug interactions in the elderly.      Aspirin is not on active medication list.  Aspirin use is not indicated based on review of current medical condition/s. Risk of harm outweighs potential benefits.  .    Patient Active Problem List   Diagnosis    Hypertension    Routine health maintenance    Dyspepsia    Elevated fasting blood sugar    Other hyperlipidemia    Prediabetes     Advance Care Planning Advance Directive is not on file.  ACP discussion was held with the patient during this visit. Patient has an advance directive (not in EMR), copy requested.            Objective   Vitals:    12/27/24 0823   BP: 132/82   BP Location: Left arm   Patient Position: Sitting   Cuff Size: Adult   Pulse: 61   Temp: 98.4 °F (36.9 °C)   SpO2: 96%   Weight: 78 kg (172 lb)   Height: 177.8 cm  "(70\")       Estimated body mass index is 24.68 kg/m² as calculated from the following:    Height as of this encounter: 177.8 cm (70\").    Weight as of this encounter: 78 kg (172 lb).    BMI is within normal parameters. No other follow-up for BMI required.           Does the patient have evidence of cognitive impairment? No, he has no concerns. Did a mini cog and scored 2/5.                                                                                                Health  Risk Assessment    Smoking Status:  Social History     Tobacco Use   Smoking Status Former    Current packs/day: 0.00    Average packs/day: 1 pack/day for 12.0 years (12.0 ttl pk-yrs)    Types: Cigarettes    Start date: 1999    Quit date: 2011    Years since quittin.9   Smokeless Tobacco Never     Alcohol Consumption:  Social History     Substance and Sexual Activity   Alcohol Use Not Currently       Fall Risk Screen  STEADI Fall Risk Assessment was completed, and patient is at LOW risk for falls.Assessment completed on:2024    Depression Screening   Little interest or pleasure in doing things? Not at all   Feeling down, depressed, or hopeless? Not at all   PHQ-2 Total Score 0      Health Habits and Functional and Cognitive Screenin/20/2024    10:26 PM   Functional & Cognitive Status   Do you have difficulty preparing food and eating? No    Do you have difficulty bathing yourself, getting dressed or grooming yourself? No    Do you have difficulty using the toilet? No    Do you have difficulty moving around from place to place? No    Do you have trouble with steps or getting out of a bed or a chair? No    Current Diet Well Balanced Diet    Dental Exam Up to date    Eye Exam Up to date    Exercise (times per week) 6 times per week    Current Exercises Include Gardening;House Cleaning;Walking;Yard Work    Do you need help using the phone?  No    Are you deaf or do you have serious difficulty hearing?  No    Do you need " help to go to places out of walking distance? No    Do you need help shopping? No    Do you need help preparing meals?  No    Do you need help with housework?  No    Do you need help with laundry? No    Do you need help taking your medications? No    Do you need help managing money? No    Do you ever drive or ride in a car without wearing a seat belt? No    Have you felt unusual stress, anger or loneliness in the last month? No    Who do you live with? Child    If you need help, do you have trouble finding someone available to you? No    Have you been bothered in the last four weeks by sexual problems? No    Do you have difficulty concentrating, remembering or making decisions? No        Patient-reported           Age-appropriate Screening Schedule:  Refer to the list below for future screening recommendations based on patient's age, sex and/or medical conditions. Orders for these recommended tests are listed in the plan section. The patient has been provided with a written plan.    Health Maintenance List  Health Maintenance   Topic Date Due    TDAP/TD VACCINES (1 - Tdap) Never done    ZOSTER VACCINE (1 of 2) Never done    Pneumococcal Vaccine 65+ (1 of 1 - PCV) Never done    INFLUENZA VACCINE  Never done    COVID-19 Vaccine (4 - 2024-25 season) 09/01/2024    LIPID PANEL  08/29/2025    ANNUAL WELLNESS VISIT  12/27/2025    HEPATITIS C SCREENING  Completed    AAA SCREEN ONCE  Discontinued    COLORECTAL CANCER SCREENING  Discontinued                                                                                                                                                CMS Preventative Services Quick Reference  Risk Factors Identified During Encounter  None Identified    The above risks/problems have been discussed with the patient.  Pertinent information has been shared with the patient in the After Visit Summary.  An After Visit Summary and PPPS were made available to the patient.    Follow Up:   Next Medicare  Wellness visit to be scheduled in 1 year.     Assessment & Plan  Encounter for subsequent annual wellness visit (AWV) in Medicare patient         Encounter for immunization    Orders:    Fluzone >6mos ([id number])    COVID-19 (Pfizer) 12yrs+ (COMIRNATY)    Pneumococcal Conjugate Vaccine 20-Valent (PCV20)     Here today for annual wellness.   Vaccines and cancer screenings discussed.  Preventative health info added to AVS.      Follow Up:   Return in about 3 months (around 3/27/2025).

## 2025-03-27 ENCOUNTER — OFFICE VISIT (OUTPATIENT)
Dept: FAMILY MEDICINE CLINIC | Facility: CLINIC | Age: 70
End: 2025-03-27
Payer: MEDICARE

## 2025-03-27 VITALS
DIASTOLIC BLOOD PRESSURE: 70 MMHG | HEIGHT: 70 IN | HEART RATE: 74 BPM | WEIGHT: 173 LBS | TEMPERATURE: 98 F | OXYGEN SATURATION: 96 % | BODY MASS INDEX: 24.77 KG/M2 | SYSTOLIC BLOOD PRESSURE: 124 MMHG

## 2025-03-27 DIAGNOSIS — I10 PRIMARY HYPERTENSION: Primary | ICD-10-CM

## 2025-03-27 DIAGNOSIS — R73.03 PREDIABETES: ICD-10-CM

## 2025-03-27 DIAGNOSIS — E78.49 OTHER HYPERLIPIDEMIA: ICD-10-CM

## 2025-03-27 PROBLEM — R73.01 ELEVATED FASTING BLOOD SUGAR: Status: RESOLVED | Noted: 2023-02-27 | Resolved: 2025-03-27

## 2025-03-27 PROBLEM — R10.13 DYSPEPSIA: Status: RESOLVED | Noted: 2023-01-26 | Resolved: 2025-03-27

## 2025-03-27 NOTE — PROGRESS NOTES
"Chief Complaint  Hypertension and Prediabetes    Subjective        Hima Garner presents to [facility]    History of Present Illness  Answers submitted by the patient for this visit:  High Blood Pressure Questionnaire (Submitted on 3/25/2025)  Chief Complaint: Hypertension  Chronicity: chronic  Onset: more than 1 year ago  Progression since onset: stable  anxiety: Yes  blurred vision: No  chest pain: No  headaches: No  malaise/fatigue: No  orthopnea: No  palpitations: No  peripheral edema: No  shortness of breath: No  Agents associated with hypertension: no associated agents  Compliance problems: no compliance problems        The patient presents for a routine follow-up visit.    He reports a general sense of well-being, with no new health concerns at this time. He has recently resumed his work schedule after a winter hiatus. His weight has been relatively stable, fluctuating between 165 to 167 pounds last year, increasing to 175 pounds during the winter, and currently standing at 173 pounds. He anticipates a return to his usual weight range of 165 to 167 pounds. He is not experiencing any symptoms of acid reflux or digestive issues. He expresses a preference to defer vaccinations until closer to the season due to a personal aversion to needles. He is uncertain about the status of his tetanus vaccination within the past decade.    SOCIAL HISTORY  He is working part time at [employer] and part time [employer].    MEDICATIONS  amlodipine, Zetia       Objective   Vital Signs:  /70 (BP Location: Left arm, Patient Position: Sitting, Cuff Size: Adult)   Pulse 74   Temp 98 °F (36.7 °C)   Ht 177.8 cm (70\")   Wt 78.5 kg (173 lb)   SpO2 96%   BMI 24.82 kg/m²   Estimated body mass index is 24.82 kg/m² as calculated from the following:    Height as of this encounter: 177.8 cm (70\").    Weight as of this encounter: 78.5 kg (173 lb).    BMI is within normal parameters. No other follow-up " for BMI required.      Physical Exam  Vitals and nursing note reviewed.   Constitutional:       General: He is not in acute distress.     Appearance: Normal appearance. He is not ill-appearing.   HENT:      Head: Normocephalic and atraumatic.      Nose: Nose normal.      Mouth/Throat:      Mouth: Mucous membranes are moist.   Eyes:      Extraocular Movements: Extraocular movements intact.      Conjunctiva/sclera: Conjunctivae normal.   Cardiovascular:      Rate and Rhythm: Normal rate and regular rhythm.      Heart sounds: Normal heart sounds. No murmur heard.     No gallop.   Pulmonary:      Effort: Pulmonary effort is normal. No respiratory distress.      Breath sounds: Normal breath sounds. No stridor. No wheezing, rhonchi or rales.   Chest:      Chest wall: No tenderness.   Skin:     General: Skin is warm and dry.   Neurological:      General: No focal deficit present.      Mental Status: He is alert and oriented to person, place, and time. Mental status is at baseline.   Psychiatric:         Mood and Affect: Mood normal.         Behavior: Behavior normal.          Physical Exam  Lungs were auscultated.    Vital Signs  Blood pressure is 124/70.       Result Review :               Results            Assessment and Plan   Diagnoses and all orders for this visit:    1. Primary hypertension (Primary)  -     Comprehensive metabolic panel    2. Prediabetes  -     Hemoglobin A1c    3. Other hyperlipidemia        Assessment & Plan  1. Routine follow-up.  His blood pressure readings are within the normal range today at 124/70. He is currently on amlodipine and Zetia. His weight has increased slightly, which is typical during the winter months, but it tends to decrease with increased physical activity during his work season. He has expressed a preference to postpone vaccinations until closer to the season. A comprehensive blood panel will be ordered today, including tests for kidney function, liver function, electrolytes,  and a 3-month average blood glucose level. 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    Follow-up  The patient will follow up in 6 months for a regular visit and then for Medicare wellness visit 6 months thereafter.            Follow Up   Return in about 6 months (around 9/27/2025) for Chronic care.  Patient was given instructions and counseling regarding his condition or for health maintenance advice. Please see specific information pulled into the AVS if appropriate.           Patient or patient representative verbalized consent for the use of Ambient Listening during the visit with  Alayna Millan DO for chart documentation. 3/27/2025  08:12 EDT

## 2025-03-28 LAB
ALBUMIN SERPL-MCNC: 4.3 G/DL (ref 3.5–5.2)
ALBUMIN/GLOB SERPL: 1.8 G/DL
ALP SERPL-CCNC: 67 U/L (ref 39–117)
ALT SERPL-CCNC: 16 U/L (ref 1–41)
AST SERPL-CCNC: 17 U/L (ref 1–40)
BILIRUB SERPL-MCNC: 0.3 MG/DL (ref 0–1.2)
BUN SERPL-MCNC: 13 MG/DL (ref 8–23)
BUN/CREAT SERPL: 14 (ref 7–25)
CALCIUM SERPL-MCNC: 9.7 MG/DL (ref 8.6–10.5)
CHLORIDE SERPL-SCNC: 106 MMOL/L (ref 98–107)
CO2 SERPL-SCNC: 26.8 MMOL/L (ref 22–29)
CREAT SERPL-MCNC: 0.93 MG/DL (ref 0.76–1.27)
EGFRCR SERPLBLD CKD-EPI 2021: 88.9 ML/MIN/1.73
GLOBULIN SER CALC-MCNC: 2.4 GM/DL
GLUCOSE SERPL-MCNC: 112 MG/DL (ref 65–99)
HBA1C MFR BLD: 5.9 % (ref 4.8–5.6)
POTASSIUM SERPL-SCNC: 5.1 MMOL/L (ref 3.5–5.2)
PROT SERPL-MCNC: 6.7 G/DL (ref 6–8.5)
SODIUM SERPL-SCNC: 140 MMOL/L (ref 136–145)

## 2025-07-10 DIAGNOSIS — E78.49 OTHER HYPERLIPIDEMIA: ICD-10-CM

## 2025-07-10 RX ORDER — EZETIMIBE 10 MG/1
10 TABLET ORAL DAILY
Qty: 90 TABLET | Refills: 2 | Status: SHIPPED | OUTPATIENT
Start: 2025-07-10